# Patient Record
Sex: FEMALE | Race: WHITE | NOT HISPANIC OR LATINO
[De-identification: names, ages, dates, MRNs, and addresses within clinical notes are randomized per-mention and may not be internally consistent; named-entity substitution may affect disease eponyms.]

---
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
## 2024-11-25 NOTE — ED PEDIATRIC TRIAGE NOTE - AS TEMP SITE
In an effort to ensure that our patients LiveWell, a Team Member has reviewed your chart and identified an opportunity to provide the best care possible. An attempt was made to discuss or schedule due or overdue Preventive or Chronic Condition care.Care Gaps identified: Wellness Visits.    The Outcome was Contact was not made, left message. We are attempting to schedule a cav. If you have any questions or need help with scheduling, contact our Health Outreach Team at [phone].   Type of Appointment needed: Comprehensive Annual Visit   temporal

## 2025-07-23 NOTE — ED PEDIATRIC NURSE NOTE - NS ED NOTE  [facility]
Received request via: Patient    Was the patient seen in the last year in this department? Yes    Does the patient have an active prescription (recently filled or refills available) for medication(s) requested? No    Does the patient have shelter Plus and need 100-day supply? (This applies to ALL medications) Patient does not have SCP  
no